# Patient Record
Sex: FEMALE | Race: ASIAN | URBAN - METROPOLITAN AREA
[De-identification: names, ages, dates, MRNs, and addresses within clinical notes are randomized per-mention and may not be internally consistent; named-entity substitution may affect disease eponyms.]

---

## 2018-05-02 ENCOUNTER — APPOINTMENT (OUTPATIENT)
Dept: URBAN - METROPOLITAN AREA SURGERY 9 | Age: 48
Setting detail: DERMATOLOGY
End: 2018-05-02

## 2018-05-02 DIAGNOSIS — Q826 OTHER SPECIFIED ANOMALIES OF SKIN: ICD-10-CM

## 2018-05-02 DIAGNOSIS — D49.2 NEOPLASM OF UNSPECIFIED BEHAVIOR OF BONE, SOFT TISSUE, AND SKIN: ICD-10-CM

## 2018-05-02 DIAGNOSIS — L85.3 XEROSIS CUTIS: ICD-10-CM

## 2018-05-02 DIAGNOSIS — D18.0 HEMANGIOMA: ICD-10-CM

## 2018-05-02 DIAGNOSIS — D22 MELANOCYTIC NEVI: ICD-10-CM

## 2018-05-02 DIAGNOSIS — L72.8 OTHER FOLLICULAR CYSTS OF THE SKIN AND SUBCUTANEOUS TISSUE: ICD-10-CM

## 2018-05-02 DIAGNOSIS — Q828 OTHER SPECIFIED ANOMALIES OF SKIN: ICD-10-CM

## 2018-05-02 DIAGNOSIS — Q819 OTHER SPECIFIED ANOMALIES OF SKIN: ICD-10-CM

## 2018-05-02 PROBLEM — J45.909 UNSPECIFIED ASTHMA, UNCOMPLICATED: Status: ACTIVE | Noted: 2018-05-02

## 2018-05-02 PROBLEM — D23.72 OTHER BENIGN NEOPLASM OF SKIN OF LEFT LOWER LIMB, INCLUDING HIP: Status: ACTIVE | Noted: 2018-05-02

## 2018-05-02 PROBLEM — J30.1 ALLERGIC RHINITIS DUE TO POLLEN: Status: ACTIVE | Noted: 2018-05-02

## 2018-05-02 PROBLEM — Q82.8 OTHER SPECIFIED CONGENITAL MALFORMATIONS OF SKIN: Status: ACTIVE | Noted: 2018-05-02

## 2018-05-02 PROBLEM — D22.5 MELANOCYTIC NEVI OF TRUNK: Status: ACTIVE | Noted: 2018-05-02

## 2018-05-02 PROBLEM — D48.5 NEOPLASM OF UNCERTAIN BEHAVIOR OF SKIN: Status: ACTIVE | Noted: 2018-05-02

## 2018-05-02 PROCEDURE — OTHER REASSURANCE: OTHER

## 2018-05-02 PROCEDURE — OTHER COUNSELING: OTHER

## 2018-05-02 PROCEDURE — OTHER PRESCRIPTION: OTHER

## 2018-05-02 PROCEDURE — OTHER PATHOLOGY BILLING: OTHER

## 2018-05-02 PROCEDURE — OTHER BIOPSY BY SHAVE METHOD: OTHER

## 2018-05-02 PROCEDURE — 11100: CPT

## 2018-05-02 PROCEDURE — OTHER CONSULTATION EXCISION: OTHER

## 2018-05-02 PROCEDURE — 99203 OFFICE O/P NEW LOW 30 MIN: CPT | Mod: 25

## 2018-05-02 PROCEDURE — OTHER OTHER: OTHER

## 2018-05-02 PROCEDURE — 88305 TISSUE EXAM BY PATHOLOGIST: CPT

## 2018-05-02 PROCEDURE — OTHER MIPS QUALITY: OTHER

## 2018-05-02 RX ORDER — TRIAMCINOLONE ACETONIDE 1 MG/G
CREAM TOPICAL BID
Qty: 1 | Refills: 0 | Status: ERX | COMMUNITY
Start: 2018-05-02

## 2018-05-02 ASSESSMENT — LOCATION DETAILED DESCRIPTION DERM
LOCATION DETAILED: LEFT LATERAL BREAST 5-6:00 REGION
LOCATION DETAILED: RIGHT ELBOW
LOCATION DETAILED: SUPERIOR THORACIC SPINE
LOCATION DETAILED: LEFT DISTAL POSTERIOR UPPER ARM
LOCATION DETAILED: LEFT LATERAL BREAST 3-4:00 REGION
LOCATION DETAILED: RIGHT DISTAL CALF
LOCATION DETAILED: RIGHT PROXIMAL POSTERIOR UPPER ARM

## 2018-05-02 ASSESSMENT — LOCATION SIMPLE DESCRIPTION DERM
LOCATION SIMPLE: LEFT BREAST
LOCATION SIMPLE: LEFT POSTERIOR UPPER ARM
LOCATION SIMPLE: RIGHT CALF
LOCATION SIMPLE: RIGHT ELBOW
LOCATION SIMPLE: UPPER BACK
LOCATION SIMPLE: RIGHT POSTERIOR UPPER ARM

## 2018-05-02 ASSESSMENT — LOCATION ZONE DERM
LOCATION ZONE: LEG
LOCATION ZONE: TRUNK
LOCATION ZONE: ARM

## 2018-05-02 NOTE — PROCEDURE: OTHER
Note Text (......Xxx Chief Complaint.): This diagnosis correlates with the
Other (Free Text): Recommended patient set up a 10 min punch excision. The lesion is long standing. No change in size. Tender only to pressure.
Detail Level: Detailed
Other (Free Text): Recommended patient follow up with OBGYN. Will send office note to OBGYN as the lesion is not connected to the skin and don't think it is linked to overlying skin findings

## 2018-05-07 ENCOUNTER — APPOINTMENT (OUTPATIENT)
Dept: URBAN - METROPOLITAN AREA SURGERY 9 | Age: 48
Setting detail: DERMATOLOGY
End: 2018-05-07

## 2018-05-07 DIAGNOSIS — L72.8 OTHER FOLLICULAR CYSTS OF THE SKIN AND SUBCUTANEOUS TISSUE: ICD-10-CM

## 2018-05-07 PROBLEM — D48.5 NEOPLASM OF UNCERTAIN BEHAVIOR OF SKIN: Status: ACTIVE | Noted: 2018-05-07

## 2018-05-07 PROCEDURE — OTHER PUNCH EXCISION: OTHER

## 2018-05-07 PROCEDURE — 88305 TISSUE EXAM BY PATHOLOGIST: CPT

## 2018-05-07 PROCEDURE — 11401 EXC TR-EXT B9+MARG 0.6-1 CM: CPT

## 2018-05-07 PROCEDURE — OTHER PATHOLOGY BILLING: OTHER

## 2018-05-07 ASSESSMENT — LOCATION ZONE DERM: LOCATION ZONE: ARM

## 2018-05-07 ASSESSMENT — LOCATION DETAILED DESCRIPTION DERM: LOCATION DETAILED: RIGHT ELBOW

## 2018-05-07 ASSESSMENT — LOCATION SIMPLE DESCRIPTION DERM: LOCATION SIMPLE: RIGHT ELBOW

## 2018-05-21 ENCOUNTER — APPOINTMENT (OUTPATIENT)
Dept: URBAN - METROPOLITAN AREA SURGERY 9 | Age: 48
Setting detail: DERMATOLOGY
End: 2018-05-21

## 2018-05-21 DIAGNOSIS — Z48.02 ENCOUNTER FOR REMOVAL OF SUTURES: ICD-10-CM

## 2018-05-21 PROCEDURE — OTHER SUTURE REMOVAL (GLOBAL PERIOD): OTHER

## 2018-05-21 PROCEDURE — 99024 POSTOP FOLLOW-UP VISIT: CPT

## 2018-05-21 ASSESSMENT — LOCATION SIMPLE DESCRIPTION DERM: LOCATION SIMPLE: RIGHT ELBOW

## 2018-05-21 ASSESSMENT — LOCATION ZONE DERM: LOCATION ZONE: ARM

## 2018-05-21 ASSESSMENT — LOCATION DETAILED DESCRIPTION DERM: LOCATION DETAILED: RIGHT ELBOW

## 2018-05-21 NOTE — PROCEDURE: SUTURE REMOVAL (GLOBAL PERIOD)
Add 61701 Cpt? (Important Note: In 2017 The Use Of 06275 Is Being Tracked By Cms To Determine Future Global Period Reimbursement For Global Periods): yes
Detail Level: Simple

## 2019-06-05 ENCOUNTER — APPOINTMENT (OUTPATIENT)
Dept: URBAN - METROPOLITAN AREA SURGERY 9 | Age: 49
Setting detail: DERMATOLOGY
End: 2019-06-05

## 2019-06-05 DIAGNOSIS — D22 MELANOCYTIC NEVI: ICD-10-CM

## 2019-06-05 DIAGNOSIS — L91.8 OTHER HYPERTROPHIC DISORDERS OF THE SKIN: ICD-10-CM

## 2019-06-05 DIAGNOSIS — L85.3 XEROSIS CUTIS: ICD-10-CM

## 2019-06-05 DIAGNOSIS — Q83.3 ACCESSORY NIPPLE: ICD-10-CM

## 2019-06-05 DIAGNOSIS — L81.1 CHLOASMA: ICD-10-CM

## 2019-06-05 DIAGNOSIS — T07XXXA INSECT BITE, NONVENOMOUS, OF OTHER, MULTIPLE, AND UNSPECIFIED SITES, WITHOUT MENTION OF INFECTION: ICD-10-CM

## 2019-06-05 PROBLEM — D23.72 OTHER BENIGN NEOPLASM OF SKIN OF LEFT LOWER LIMB, INCLUDING HIP: Status: ACTIVE | Noted: 2019-06-05

## 2019-06-05 PROBLEM — S80.862A INSECT BITE (NONVENOMOUS), LEFT LOWER LEG, INITIAL ENCOUNTER: Status: ACTIVE | Noted: 2019-06-05

## 2019-06-05 PROBLEM — S90.861A INSECT BITE (NONVENOMOUS), RIGHT FOOT, INITIAL ENCOUNTER: Status: ACTIVE | Noted: 2019-06-05

## 2019-06-05 PROBLEM — D48.5 NEOPLASM OF UNCERTAIN BEHAVIOR OF SKIN: Status: ACTIVE | Noted: 2019-06-05

## 2019-06-05 PROBLEM — S80.861A INSECT BITE (NONVENOMOUS), RIGHT LOWER LEG, INITIAL ENCOUNTER: Status: ACTIVE | Noted: 2019-06-05

## 2019-06-05 PROBLEM — D23.5 OTHER BENIGN NEOPLASM OF SKIN OF TRUNK: Status: ACTIVE | Noted: 2019-06-05

## 2019-06-05 PROBLEM — D22.5 MELANOCYTIC NEVI OF TRUNK: Status: ACTIVE | Noted: 2019-06-05

## 2019-06-05 PROBLEM — S90.862A INSECT BITE (NONVENOMOUS), LEFT FOOT, INITIAL ENCOUNTER: Status: ACTIVE | Noted: 2019-06-05

## 2019-06-05 PROCEDURE — OTHER COUNSELING: OTHER

## 2019-06-05 PROCEDURE — OTHER PRESCRIPTION: OTHER

## 2019-06-05 PROCEDURE — OTHER REASSURANCE: OTHER

## 2019-06-05 PROCEDURE — 99214 OFFICE O/P EST MOD 30 MIN: CPT

## 2019-06-05 RX ORDER — TRIAMCINOLONE ACETONIDE 1 MG/G
CREAM TOPICAL BID
Qty: 1 | Refills: 0 | Status: ERX

## 2019-06-05 ASSESSMENT — LOCATION SIMPLE DESCRIPTION DERM
LOCATION SIMPLE: RIGHT CALF
LOCATION SIMPLE: RIGHT CHEEK
LOCATION SIMPLE: LEFT UPPER BACK
LOCATION SIMPLE: LEFT FOOT
LOCATION SIMPLE: LEFT CHEEK
LOCATION SIMPLE: LEFT INFERIOR EYELID
LOCATION SIMPLE: LEFT BREAST
LOCATION SIMPLE: RIGHT FOOT
LOCATION SIMPLE: LEFT ANTERIOR NECK
LOCATION SIMPLE: RIGHT PRETIBIAL REGION
LOCATION SIMPLE: LEFT PRETIBIAL REGION
LOCATION SIMPLE: RIGHT BREAST
LOCATION SIMPLE: RIGHT ANTERIOR NECK

## 2019-06-05 ASSESSMENT — LOCATION DETAILED DESCRIPTION DERM
LOCATION DETAILED: LEFT INFERIOR ANTERIOR NECK
LOCATION DETAILED: RIGHT AXILLARY TAIL OF BREAST
LOCATION DETAILED: LEFT MEDIAL INFERIOR EYELID
LOCATION DETAILED: LEFT INFERIOR CENTRAL MALAR CHEEK
LOCATION DETAILED: RIGHT CENTRAL MALAR CHEEK
LOCATION DETAILED: LEFT MID-UPPER BACK
LOCATION DETAILED: LEFT DISTAL PRETIBIAL REGION
LOCATION DETAILED: LEFT AXILLARY TAIL OF BREAST
LOCATION DETAILED: RIGHT DISTAL PRETIBIAL REGION
LOCATION DETAILED: RIGHT SUPERIOR ANTERIOR NECK
LOCATION DETAILED: RIGHT DISTAL CALF
LOCATION DETAILED: LEFT DORSAL FOOT
LOCATION DETAILED: RIGHT DORSAL FOOT

## 2019-06-05 ASSESSMENT — LOCATION ZONE DERM
LOCATION ZONE: FACE
LOCATION ZONE: LEG
LOCATION ZONE: FEET
LOCATION ZONE: NECK
LOCATION ZONE: TRUNK
LOCATION ZONE: EYELID

## 2019-06-05 ASSESSMENT — SEVERITY ASSESSMENT: SEVERITY: ALMOST CLEAR

## 2020-10-28 ENCOUNTER — APPOINTMENT (OUTPATIENT)
Dept: URBAN - METROPOLITAN AREA SURGERY 9 | Age: 50
Setting detail: DERMATOLOGY
End: 2020-10-28

## 2020-10-28 DIAGNOSIS — L82.0 INFLAMED SEBORRHEIC KERATOSIS: ICD-10-CM

## 2020-10-28 DIAGNOSIS — L85.3 XEROSIS CUTIS: ICD-10-CM

## 2020-10-28 DIAGNOSIS — L70.8 OTHER ACNE: ICD-10-CM

## 2020-10-28 DIAGNOSIS — L82.1 OTHER SEBORRHEIC KERATOSIS: ICD-10-CM

## 2020-10-28 PROCEDURE — OTHER TREATMENT REGIMEN: OTHER

## 2020-10-28 PROCEDURE — OTHER PRESCRIPTION: OTHER

## 2020-10-28 PROCEDURE — OTHER REASSURANCE: OTHER

## 2020-10-28 PROCEDURE — OTHER COUNSELING: OTHER

## 2020-10-28 PROCEDURE — OTHER LIQUID NITROGEN: OTHER

## 2020-10-28 PROCEDURE — 17110 DESTRUCT B9 LESION 1-14: CPT

## 2020-10-28 PROCEDURE — 99213 OFFICE O/P EST LOW 20 MIN: CPT | Mod: 25

## 2020-10-28 RX ORDER — TRIAMCINOLONE ACETONIDE 1 MG/G
CREAM TOPICAL BID
Qty: 1 | Refills: 1 | Status: ERX

## 2020-10-28 ASSESSMENT — LOCATION DETAILED DESCRIPTION DERM
LOCATION DETAILED: RIGHT MEDIAL MALAR CHEEK
LOCATION DETAILED: RIGHT CENTRAL ZYGOMA
LOCATION DETAILED: RIGHT MEDIAL BUCCAL CHEEK
LOCATION DETAILED: RIGHT DISTAL CALF

## 2020-10-28 ASSESSMENT — LOCATION SIMPLE DESCRIPTION DERM
LOCATION SIMPLE: RIGHT CHEEK
LOCATION SIMPLE: RIGHT ZYGOMA
LOCATION SIMPLE: RIGHT CALF

## 2020-10-28 ASSESSMENT — SEVERITY ASSESSMENT: SEVERITY: ALMOST CLEAR

## 2020-10-28 ASSESSMENT — LOCATION ZONE DERM
LOCATION ZONE: LEG
LOCATION ZONE: FACE

## 2020-10-28 NOTE — PROCEDURE: TREATMENT REGIMEN
Detail Level: Simple
Continue Regimen: TAC as needed flares
Initiate Treatment: Mild soap and Aggressive moisturizing

## 2020-10-28 NOTE — PROCEDURE: LIQUID NITROGEN
Number Of Freeze-Thaw Cycles: 2 freeze-thaw cycles
Medical Necessity Clause: This procedure was medically necessary because the lesions that were treated were:
Render Post-Care Instructions In Note?: no
Post-Care Instructions: I reviewed with the patient in detail post-care instructions. Patient is to wear sunprotection, and avoid picking at any of the treated lesions. Pt may apply Vaseline to crusted or scabbing areas.
Medical Necessity Information: It is in your best interest to select a reason for this procedure from the list below. All of these items fulfill various CMS LCD requirements except the new and changing color options.
Consent: The patient's consent was obtained including but not limited to risks of crusting, scabbing, blistering, scarring, darker or lighter pigmentary change, recurrence, incomplete removal and infection.
Detail Level: Detailed
Duration Of Freeze Thaw-Cycle (Seconds): 10-15